# Patient Record
Sex: FEMALE | Employment: UNEMPLOYED | URBAN - METROPOLITAN AREA
[De-identification: names, ages, dates, MRNs, and addresses within clinical notes are randomized per-mention and may not be internally consistent; named-entity substitution may affect disease eponyms.]

---

## 2019-10-02 PROBLEM — R79.89 ELEVATED D-DIMER: Status: ACTIVE | Noted: 2019-10-02

## 2019-10-02 PROBLEM — I21.9 MYOCARDIAL INFARCT (HCC): Status: ACTIVE | Noted: 2019-10-02

## 2019-10-02 PROBLEM — R42 DIZZINESS: Status: ACTIVE | Noted: 2019-10-02

## 2019-10-02 PROBLEM — I10 MALIGNANT HYPERTENSION: Status: ACTIVE | Noted: 2019-10-02

## 2019-10-02 PROBLEM — I21.A1 TYPE 2 MYOCARDIAL INFARCTION (HCC): Status: ACTIVE | Noted: 2019-10-02

## 2019-10-02 PROBLEM — I10 ACCELERATED HYPERTENSION: Status: ACTIVE | Noted: 2019-10-02

## 2019-10-02 PROBLEM — E87.6 HYPOKALEMIA: Status: ACTIVE | Noted: 2019-10-02

## 2019-10-02 PROBLEM — E07.9 THYROID DYSFUNCTION: Status: ACTIVE | Noted: 2019-10-02

## 2019-10-02 PROBLEM — N17.9 AKI (ACUTE KIDNEY INJURY) (HCC): Status: ACTIVE | Noted: 2019-10-02

## 2019-10-02 PROBLEM — I10 UNCONTROLLED HYPERTENSION: Status: ACTIVE | Noted: 2019-10-02

## 2019-11-26 ENCOUNTER — OFFICE VISIT (OUTPATIENT)
Dept: CARDIOLOGY CLINIC | Age: 39
End: 2019-11-26

## 2019-11-26 VITALS
WEIGHT: 190 LBS | BODY MASS INDEX: 34.96 KG/M2 | SYSTOLIC BLOOD PRESSURE: 129 MMHG | DIASTOLIC BLOOD PRESSURE: 86 MMHG | HEIGHT: 62 IN | HEART RATE: 74 BPM

## 2019-11-26 DIAGNOSIS — I10 HYPERTENSION, UNSPECIFIED TYPE: ICD-10-CM

## 2019-11-26 DIAGNOSIS — R55 NEAR SYNCOPE: Primary | ICD-10-CM

## 2019-11-26 NOTE — PROGRESS NOTES
Chief Complaint   Patient presents with    Dizziness     patient is complaining of an episode of dizzness. she states she was walking with her daughter and it was 97degrees outside. she was seen at [address] [place] [address] in october for these symptoms.

## 2019-11-26 NOTE — PROGRESS NOTES
[facility]    Ms. Jose Luis Jiménez is a 42-year-old female with hypertension, obesity, seizure disorder    Patient is here today to establish care with me. Patient denies any prior history of myocardial infarction or congestive heart failure. Last month, patient had dizziness without any syncopal episode. She went to [facility] where she underwent stress test.  She is here today for follow-up appointment. She denies any chest pain or chest tightness. She denies any PND or lower extremity swelling. She denies any palpitation, presyncope or syncope. Overall she has no cardiac symptoms to report. Denies any nausea, vomiting, abdominal pain, fever, chills, sputum production. No hematuria or other bleeding complaints    Past Medical History:   Diagnosis Date    Hypertension     IBS (irritable bowel syndrome)     Obesity     Seizures (HCC)          Past Surgical History:   Procedure Laterality Date    ABDOMEN SURGERY PROC UNLISTED      hernia    HX GYN         Current Outpatient Medications   Medication Sig    atorvastatin (LIPITOR) 20 mg tablet Take 1 Tab by mouth nightly.  carvedilol (COREG) 25 mg tablet Take 1 Tab by mouth two (2) times daily (with meals).  hydroCHLOROthiazide (HYDRODIURIL) 25 mg tablet Take 1 Tab by mouth daily.  losartan (COZAAR) 100 mg tablet Take 1 Tab by mouth daily.  aspirin 81 mg chewable tablet Take 1 Tab by mouth daily.  magnesium oxide (MAG-OX) 400 mg tablet Take 1 Tab by mouth daily.  amLODIPine (NORVASC) 10 mg tablet Take 1 Tab by mouth daily. No current facility-administered medications for this visit.         Allergies and Sensitivities:  No Known Allergies    Family History:  Family History   Problem Relation Age of Onset    Thyroid Disease Mother     Hypertension Mother     Diabetes Father     Hypertension Father     Coronary Artery Disease Maternal Aunt     Heart Attack Maternal Aunt        Social History:  Social History     Tobacco Use    Smoking status: Former Smoker    Smokeless tobacco: Never Used   Substance Use Topics    Alcohol use: Yes    Drug use: Yes     Types: Marijuana     She  reports that she has quit smoking. She has never used smokeless tobacco.  She  reports current alcohol use. Review of Systems:  Cardiac symptoms as noted above in HPI. All others negative. Denies fatigue, malaise, skin rash, joint pain, blurring vision, photophobia, neck pain, hemoptysis, chronic cough, nausea, vomiting, hematuria, burning micturition, BRBPR, chronic headaches. Physical Exam:  BP Readings from Last 3 Encounters:   11/26/19 129/86   10/05/19 (!) 136/98         Pulse Readings from Last 3 Encounters:   11/26/19 74   10/05/19 92          Wt Readings from Last 3 Encounters:   11/26/19 190 lb (86.2 kg)   10/03/19 196 lb 3.4 oz (89 kg)       Constitutional: Oriented to person, place, and time. HENT: Head: Normocephalic and atraumatic. Eyes: Conjunctivae and extraocular motions are normal.   Neck: No JVD present. Carotid bruit is not appreciated. Cardiovascular: Regular rhythm. No murmur, gallop or rubs appreciated  Lung: Breath sounds normal. No respiratory distress. No ronchi or rales appreciated  Abdominal: No tenderness. No rebound and no guarding. Musculoskeletal: There is no lower extremity edema. No cynosis  Lymphadenopathy:  No cervical or supraclavicular adenopathy appriciated. Neurological: No gross motor deficit noted. Skin: No visible skin rash noted. No Ear discharge noted  Psychiatric: Normal mood and affect.    Good distal pulse      Review of Data  LABS:   Lab Results   Component Value Date/Time    Sodium 137 10/05/2019 05:13 AM    Potassium 3.8 10/05/2019 05:13 AM    Chloride 107 10/05/2019 05:13 AM    CO2 26 10/05/2019 05:13 AM    Glucose 85 10/05/2019 05:13 AM    BUN 4 (L) 10/05/2019 05:13 AM    Creatinine 0.7 10/05/2019 05:13 AM Lipids Latest Ref Rng & Units 10/4/2019   Chol, Total 140 - 199 mg/dl 162   HDL 40 - 96 mg/dl 87   LDL 0 - 130 mg/dl 54   Trig 29 - 150 mg/dl 103   Chol/HDL Ratio 0.0 - 4.4 Ratio 1.9   Some recent data might be hidden     Lab Results   Component Value Date/Time    ALT (SGPT) 10 (L) 10/03/2019 02:59 AM     Lab Results   Component Value Date/Time    Hemoglobin A1c 5.0 10/04/2019 04:35 AM       EKG    ECHO    STRESS TEST (10/19)  IMPRESSION:  1. No evidence of reversible myocardial ischemia. 2. Ejection fraction calculated at 45 %. 3. This is a low risk study. IMPRESSION & PLAN:  Ms. Josef Velez is a 70-year-old female with medical problems    Cardiomyopathy:  Patient had a nuclear stress test and gated images showed EF of 45%. She has history of hypertension. She does not have any anginal symptoms. This is most likely nonischemic hypertensive cardiomyopathy. We will proceed with echocardiogram to rule out LV dysfunction as gated images sometimes can be misleading. Currently on Coreg and losartan. No evidence of heart failure symptoms or fluid overload at this time    Hypertension:  BP normal.  She is on 4 antihypertensive medication. Continue Coreg, hydrochlorthiazide, losartan and amlodipine. Ordering echo to rule out hypertensive cardiovascular heart disease    Obesity:  Max weight 300 lbs. Now 190 lbs. Patient is inquiring about her need for aspirin and cholesterol medicine. Patient never had a stroke or TIA before. She never had CAD or myocardial infarction. In absence of anginal symptoms, I do not believe aspirin is necessary at this time for primary prevention. Risk, benefit and alternatives discussed. She had a fasting lipid profile in October 2019 which showed LDL of 54. She was started on atorvastatin during that hospital visit. She does not have any prior CAD or diabetes. I do not believe she needs to be on lipid-lowering agent as her LDL was 54.     This plan was discussed with patient who is in agreement. Thank you for allowing me to participate in patient care. Please feel free to call me if you have any question or concern. Liya Gutierrez MD  Please note: This document has been produced using voice recognition software. Unrecognized errors in transcription may be present.

## 2019-11-26 NOTE — PATIENT INSTRUCTIONS
Echo- 
Please call central scheduling at [phone] All testing/lab work is completed at [facility], [facility]

## 2021-03-12 PROBLEM — I16.0 HYPERTENSIVE URGENCY: Status: ACTIVE | Noted: 2021-03-12

## 2021-03-12 PROBLEM — E87.29 ALCOHOLIC KETOACIDOSIS: Status: ACTIVE | Noted: 2021-03-12

## 2021-03-12 PROBLEM — E16.2 HYPOGLYCEMIA: Status: ACTIVE | Noted: 2021-03-12
